# Patient Record
Sex: FEMALE | Race: WHITE
[De-identification: names, ages, dates, MRNs, and addresses within clinical notes are randomized per-mention and may not be internally consistent; named-entity substitution may affect disease eponyms.]

---

## 2020-01-09 NOTE — MMO
Bilateral MAMMO Bilat Screen [facility].

 

CLINICAL HISTORY:

Patient is 69 years old and is seen for screening. The patient has no family

history of breast cancer.  The patient has no personal history of cancer.

 

VIEWS:

The views performed were:  bilateral craniocaudal with tomosynthesis and

bilateral mediolateral oblique with tomosynthesis.

 

FILMS COMPARED:

The present examination has been compared to a prior imaging study performed at

[facility] on 01/10/2019.

 

This study has been interpreted with the assistance of computer-aided detection.

 

MAMMOGRAM FINDINGS:

There are scattered fibroglandular densities.

 

There are stable benign appearing calcifications seen in both breasts.

 

There are no suspicious masses, suspicious calcifications, or new areas of

architectural distortion.

 

IMPRESSION:

THERE IS NO MAMMOGRAPHIC EVIDENCE OF MALIGNANCY.

 

A ROUTINE FOLLOW-UP MAMMOGRAM IN 1 YEAR IS RECOMMENDED.

 

THE RESULTS OF THIS EXAM WERE SENT TO THE PATIENT.

 

ACR BI-RADS Category 2 - Benign finding

 

MAMMOGRAPHY NOTE:

 1. A negative mammogram report should not delay a biopsy if a dominant of

 clinically suspicious mass is present.

 2. Approximately 10% to 15% of breast cancers are not detected by

 mammography.

 3. Adenosis and dense breasts may obscure an underlying neoplasm.

 

 

Reported by: KARUNA GREEN MD

Electonically Signed: [id number]

## 2020-01-09 NOTE — ULT
BILATERAL RENAL ULTRASOUND:



HISTORY:

Chronic kidney disease.



COMPARISON:

None.



TECHNIQUE:

Gray-scale imaging of the kidneys was performed. Renal vascular imaging was performed with gray-scale
, color-flow, Doppler imaging and spectral wave-form analysis.



FINDINGS:

Right kidney: Normal cortical echotexture. No hydronephrosis.

Right kidney measurements: 4.6 x 8.6 x 7.2 cm. 

Left kidney: Normal cortical echotexture. No hydronephrosis.

Left kidney measurements 4.7 x 9.9 x 5.3 cm.

Urinary bladder: Unremarkable. Bladder volume is 56 mL.



RENAL DOPPLER:

Right renal artery: 129.0 cm/s

Left renal artery: 1.0 cm/s

Aorta: 97.2 cm/s

Right renal artery to aorta ratio: 1.33

Left renal artery to aorta ratio: 1.04

Right acuate artery resistive index: 0.6

Left arcuate artery resistive index: 0.61



IMPRESSION: 

1. No hydronephrosis.



2. Unremarkable renal Doppler.



Transcribed Date/Time: 1/9/2020 11:52 AM



Reported By: Nicole Leyva 

Electronically Signed:  1/9/2020 4:11 PM

## 2021-05-06 ENCOUNTER — HOSPITAL ENCOUNTER (OUTPATIENT)
Dept: HOSPITAL 92 - BICMAMMO | Age: 71
Discharge: HOME | End: 2021-05-06
Payer: MEDICARE

## 2021-05-06 DIAGNOSIS — Z12.31: Primary | ICD-10-CM

## 2021-05-06 PROCEDURE — 77063 BREAST TOMOSYNTHESIS BI: CPT

## 2021-05-06 PROCEDURE — 77067 SCR MAMMO BI INCL CAD: CPT

## 2022-06-29 ENCOUNTER — HOSPITAL ENCOUNTER (OUTPATIENT)
Dept: HOSPITAL 9 - MADLAB | Age: 72
Discharge: HOME | End: 2022-06-29
Attending: FAMILY MEDICINE
Payer: MEDICARE

## 2022-06-29 DIAGNOSIS — E11.29: ICD-10-CM

## 2022-06-29 DIAGNOSIS — E78.2: ICD-10-CM

## 2022-06-29 DIAGNOSIS — E03.9: ICD-10-CM

## 2022-06-29 DIAGNOSIS — I11.9: Primary | ICD-10-CM

## 2022-06-29 LAB
ALBUMIN SERPL BCG-MCNC: 3.7 G/DL (ref 3.4–4.8)
ALP SERPL-CCNC: 77 U/L (ref 40–110)
ALT SERPL W P-5'-P-CCNC: 29 U/L (ref 8–55)
ANION GAP SERPL CALC-SCNC: 17 MMOL/L (ref 10–20)
AST SERPL-CCNC: 37 U/L (ref 5–34)
BILIRUB SERPL-MCNC: 0.7 MG/DL (ref 0.2–1.2)
BUN SERPL-MCNC: 17 MG/DL (ref 9.8–20.1)
CALCIUM SERPL-MCNC: 10.2 MG/DL (ref 7.8–10.44)
CHD RISK SERPL-RTO: 4.8 (ref ?–4.5)
CHLORIDE SERPL-SCNC: 105 MMOL/L (ref 98–107)
CHOLEST SERPL-MCNC: 208 MG/DL
CO2 SERPL-SCNC: 23 MMOL/L (ref 23–31)
CREAT CL PREDICTED SERPL C-G-VRATE: 0 ML/MIN (ref 70–130)
GLOBULIN SER CALC-MCNC: 3.6 G/DL (ref 2.4–3.5)
GLUCOSE SERPL-MCNC: 231 MG/DL (ref 83–110)
HDLC SERPL-MCNC: 43 MG/DL
LDLC SERPL CALC-MCNC: 137 MG/DL
POTASSIUM SERPL-SCNC: 4.3 MMOL/L (ref 3.5–5.1)
SODIUM SERPL-SCNC: 141 MMOL/L (ref 136–145)
TRIGL SERPL-MCNC: 141 MG/DL (ref ?–150)

## 2022-06-29 PROCEDURE — 83036 HEMOGLOBIN GLYCOSYLATED A1C: CPT

## 2022-06-29 PROCEDURE — 80061 LIPID PANEL: CPT

## 2022-06-29 PROCEDURE — 84443 ASSAY THYROID STIM HORMONE: CPT

## 2022-06-29 PROCEDURE — 36415 COLL VENOUS BLD VENIPUNCTURE: CPT

## 2022-06-29 PROCEDURE — 80053 COMPREHEN METABOLIC PANEL: CPT

## 2022-08-26 ENCOUNTER — HOSPITAL ENCOUNTER (EMERGENCY)
Dept: HOSPITAL 9 - MADERS | Age: 72
Discharge: HOME | End: 2022-08-26
Payer: MEDICARE
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
## 2022-08-26 DIAGNOSIS — I10: ICD-10-CM

## 2022-08-26 DIAGNOSIS — Z79.899: ICD-10-CM

## 2022-08-26 DIAGNOSIS — S80.01XA: Primary | ICD-10-CM

## 2022-08-26 DIAGNOSIS — Z79.4: ICD-10-CM

## 2022-08-26 DIAGNOSIS — Z79.82: ICD-10-CM

## 2022-08-26 PROCEDURE — 99283 EMERGENCY DEPT VISIT LOW MDM: CPT

## 2022-08-28 ENCOUNTER — HOSPITAL ENCOUNTER (INPATIENT)
Dept: HOSPITAL 92 - ERS | Age: 72
LOS: 9 days | Discharge: NURSING, REHAB OR LONG-TERM CARE | DRG: 682 | End: 2022-09-06
Attending: INTERNAL MEDICINE | Admitting: INTERNAL MEDICINE
Payer: MEDICARE

## 2022-08-28 VITALS — BODY MASS INDEX: 53.4 KG/M2

## 2022-08-28 DIAGNOSIS — Z88.0: ICD-10-CM

## 2022-08-28 DIAGNOSIS — N17.9: Primary | ICD-10-CM

## 2022-08-28 DIAGNOSIS — Z20.822: ICD-10-CM

## 2022-08-28 DIAGNOSIS — M62.82: ICD-10-CM

## 2022-08-28 DIAGNOSIS — B96.4: ICD-10-CM

## 2022-08-28 DIAGNOSIS — I10: ICD-10-CM

## 2022-08-28 DIAGNOSIS — Z88.1: ICD-10-CM

## 2022-08-28 DIAGNOSIS — F32.A: ICD-10-CM

## 2022-08-28 DIAGNOSIS — N39.0: ICD-10-CM

## 2022-08-28 DIAGNOSIS — Z79.4: ICD-10-CM

## 2022-08-28 DIAGNOSIS — G93.41: ICD-10-CM

## 2022-08-28 DIAGNOSIS — Z90.710: ICD-10-CM

## 2022-08-28 DIAGNOSIS — E87.6: ICD-10-CM

## 2022-08-28 DIAGNOSIS — Z88.5: ICD-10-CM

## 2022-08-28 DIAGNOSIS — Z90.49: ICD-10-CM

## 2022-08-28 DIAGNOSIS — R29.6: ICD-10-CM

## 2022-08-28 DIAGNOSIS — E11.65: ICD-10-CM

## 2022-08-28 DIAGNOSIS — E66.01: ICD-10-CM

## 2022-08-28 DIAGNOSIS — E87.2: ICD-10-CM

## 2022-08-28 DIAGNOSIS — E86.0: ICD-10-CM

## 2022-08-28 LAB
ALBUMIN SERPL BCG-MCNC: 3.7 G/DL (ref 3.4–4.8)
ALP SERPL-CCNC: 78 U/L (ref 40–110)
ALT SERPL W P-5'-P-CCNC: 34 U/L (ref 8–55)
ANION GAP SERPL CALC-SCNC: 17 MMOL/L (ref 10–20)
AST SERPL-CCNC: 76 U/L (ref 5–34)
BACTERIA UR QL AUTO: (no result) HPF
BASOPHILS # BLD AUTO: 0.1 THOU/UL (ref 0–0.2)
BASOPHILS NFR BLD AUTO: 0.7 % (ref 0–1)
BILIRUB SERPL-MCNC: 1.3 MG/DL (ref 0.2–1.2)
BUN SERPL-MCNC: 30 MG/DL (ref 9.8–20.1)
CALCIUM SERPL-MCNC: 9.9 MG/DL (ref 7.8–10.44)
CHLORIDE SERPL-SCNC: 104 MMOL/L (ref 98–107)
CK SERPL-CCNC: 2451 U/L (ref 29–168)
CO2 SERPL-SCNC: 22 MMOL/L (ref 23–31)
CREAT CL PREDICTED SERPL C-G-VRATE: 0 ML/MIN (ref 70–130)
EOSINOPHIL # BLD AUTO: 0.3 THOU/UL (ref 0–0.7)
EOSINOPHIL NFR BLD AUTO: 3 % (ref 0–10)
GLOBULIN SER CALC-MCNC: 3.4 G/DL (ref 2.4–3.5)
GLUCOSE SERPL-MCNC: 340 MG/DL (ref 83–110)
HGB BLD-MCNC: 13.1 G/DL (ref 12–16)
LEUKOCYTE ESTERASE UR QL STRIP.AUTO: 500 LEU/UL
LIPASE SERPL-CCNC: 6 U/L (ref 8–78)
LYMPHOCYTES # BLD: 1.4 THOU/UL (ref 1.2–3.4)
LYMPHOCYTES NFR BLD AUTO: 13.6 % (ref 21–51)
MAGNESIUM SERPL-MCNC: 1.8 MG/DL (ref 1.6–2.6)
MCH RBC QN AUTO: 34.9 PG (ref 27–31)
MCV RBC AUTO: 102 FL (ref 78–98)
MONOCYTES # BLD AUTO: 0.8 THOU/UL (ref 0.11–0.59)
MONOCYTES NFR BLD AUTO: 8.1 % (ref 0–10)
NEUTROPHILS # BLD AUTO: 7.4 THOU/UL (ref 1.4–6.5)
NEUTROPHILS NFR BLD AUTO: 74.5 % (ref 42–75)
PLATELET # BLD AUTO: 168 THOU/UL (ref 130–400)
POTASSIUM SERPL-SCNC: 3.1 MMOL/L (ref 3.5–5.1)
PROT UR STRIP.AUTO-MCNC: 50 MG/DL
RBC # BLD AUTO: 3.74 MILL/UL (ref 4.2–5.4)
RBC UR QL AUTO: (no result) HPF (ref 0–3)
SODIUM SERPL-SCNC: 140 MMOL/L (ref 136–145)
SP GR UR STRIP: 1.02 (ref 1–1.04)
WBC # BLD AUTO: 9.9 THOU/UL (ref 4.8–10.8)

## 2022-08-28 PROCEDURE — 97139 UNLISTED THERAPEUTIC PX: CPT

## 2022-08-28 PROCEDURE — 94640 AIRWAY INHALATION TREATMENT: CPT

## 2022-08-28 PROCEDURE — 71045 X-RAY EXAM CHEST 1 VIEW: CPT

## 2022-08-28 PROCEDURE — 87186 SC STD MICRODIL/AGAR DIL: CPT

## 2022-08-28 PROCEDURE — 36600 WITHDRAWAL OF ARTERIAL BLOOD: CPT

## 2022-08-28 PROCEDURE — 36415 COLL VENOUS BLD VENIPUNCTURE: CPT

## 2022-08-28 PROCEDURE — 83880 ASSAY OF NATRIURETIC PEPTIDE: CPT

## 2022-08-28 PROCEDURE — 96361 HYDRATE IV INFUSION ADD-ON: CPT

## 2022-08-28 PROCEDURE — 87077 CULTURE AEROBIC IDENTIFY: CPT

## 2022-08-28 PROCEDURE — 81015 MICROSCOPIC EXAM OF URINE: CPT

## 2022-08-28 PROCEDURE — 80048 BASIC METABOLIC PNL TOTAL CA: CPT

## 2022-08-28 PROCEDURE — 84443 ASSAY THYROID STIM HORMONE: CPT

## 2022-08-28 PROCEDURE — 72170 X-RAY EXAM OF PELVIS: CPT

## 2022-08-28 PROCEDURE — 82140 ASSAY OF AMMONIA: CPT

## 2022-08-28 PROCEDURE — 70450 CT HEAD/BRAIN W/O DYE: CPT

## 2022-08-28 PROCEDURE — 36416 COLLJ CAPILLARY BLOOD SPEC: CPT

## 2022-08-28 PROCEDURE — 96365 THER/PROPH/DIAG IV INF INIT: CPT

## 2022-08-28 PROCEDURE — 51701 INSERT BLADDER CATHETER: CPT

## 2022-08-28 PROCEDURE — 93005 ELECTROCARDIOGRAM TRACING: CPT

## 2022-08-28 PROCEDURE — 96367 TX/PROPH/DG ADDL SEQ IV INF: CPT

## 2022-08-28 PROCEDURE — 85025 COMPLETE CBC W/AUTO DIFF WBC: CPT

## 2022-08-28 PROCEDURE — 84300 ASSAY OF URINE SODIUM: CPT

## 2022-08-28 PROCEDURE — U0003 INFECTIOUS AGENT DETECTION BY NUCLEIC ACID (DNA OR RNA); SEVERE ACUTE RESPIRATORY SYNDROME CORONAVIRUS 2 (SARS-COV-2) (CORONAVIRUS DISEASE [COVID-19]), AMPLIFIED PROBE TECHNIQUE, MAKING USE OF HIGH THROUGHPUT TECHNOLOGIES AS DESCRIBED BY CMS-2020-01-R: HCPCS

## 2022-08-28 PROCEDURE — 81003 URINALYSIS AUTO W/O SCOPE: CPT

## 2022-08-28 PROCEDURE — 87086 URINE CULTURE/COLONY COUNT: CPT

## 2022-08-28 PROCEDURE — U0005 INFEC AGEN DETEC AMPLI PROBE: HCPCS

## 2022-08-28 PROCEDURE — 82805 BLOOD GASES W/O2 SATURATION: CPT

## 2022-08-28 PROCEDURE — 87040 BLOOD CULTURE FOR BACTERIA: CPT

## 2022-08-28 PROCEDURE — 80053 COMPREHEN METABOLIC PANEL: CPT

## 2022-08-28 PROCEDURE — 83690 ASSAY OF LIPASE: CPT

## 2022-08-28 PROCEDURE — 82570 ASSAY OF URINE CREATININE: CPT

## 2022-08-28 PROCEDURE — 82550 ASSAY OF CK (CPK): CPT

## 2022-08-28 PROCEDURE — 83735 ASSAY OF MAGNESIUM: CPT

## 2022-08-28 PROCEDURE — 80069 RENAL FUNCTION PANEL: CPT

## 2022-08-29 LAB
ALBUMIN SERPL BCG-MCNC: 3.2 G/DL (ref 3.4–4.8)
ANION GAP SERPL CALC-SCNC: 18 MMOL/L (ref 10–20)
BASOPHILS # BLD AUTO: 0.1 THOU/UL (ref 0–0.2)
BASOPHILS NFR BLD AUTO: 0.9 % (ref 0–1)
BUN SERPL-MCNC: 27 MG/DL (ref 9.8–20.1)
BUN/CREAT SERPL: 15.88
CALCIUM SERPL-MCNC: 8.8 MG/DL (ref 7.8–10.44)
CHLORIDE SERPL-SCNC: 109 MMOL/L (ref 98–107)
CK SERPL-CCNC: 3359 U/L (ref 29–168)
CO2 SERPL-SCNC: 17 MMOL/L (ref 23–31)
CREAT CL PREDICTED SERPL C-G-VRATE: 0 ML/MIN (ref 70–130)
EOSINOPHIL # BLD AUTO: 0.5 THOU/UL (ref 0–0.7)
EOSINOPHIL NFR BLD AUTO: 5.5 % (ref 0–10)
GLUCOSE SERPL-MCNC: 329 MG/DL (ref 83–110)
HGB BLD-MCNC: 12.3 G/DL (ref 12–16)
LYMPHOCYTES # BLD: 1.8 THOU/UL (ref 1.2–3.4)
LYMPHOCYTES NFR BLD AUTO: 20.1 % (ref 21–51)
MCH RBC QN AUTO: 34.1 PG (ref 27–31)
MCV RBC AUTO: 103 FL (ref 78–98)
MONOCYTES # BLD AUTO: 0.8 THOU/UL (ref 0.11–0.59)
MONOCYTES NFR BLD AUTO: 8.7 % (ref 0–10)
NEUTROPHILS # BLD AUTO: 5.9 THOU/UL (ref 1.4–6.5)
NEUTROPHILS NFR BLD AUTO: 64.8 % (ref 42–75)
PLATELET # BLD AUTO: 138 THOU/UL (ref 130–400)
POTASSIUM SERPL-SCNC: 3.5 MMOL/L (ref 3.5–5.1)
RBC # BLD AUTO: 3.6 MILL/UL (ref 4.2–5.4)
SODIUM SERPL-SCNC: 140 MMOL/L (ref 136–145)
SODIUM UR-SCNC: 40 MMOL/L
WBC # BLD AUTO: 9.1 THOU/UL (ref 4.8–10.8)

## 2022-08-29 RX ADMIN — INSULIN LISPRO PRN UNIT: 100 INJECTION, SOLUTION INTRAVENOUS; SUBCUTANEOUS at 22:29

## 2022-08-29 RX ADMIN — INSULIN GLARGINE SCH UNITS: 100 INJECTION, SOLUTION SUBCUTANEOUS at 22:29

## 2022-08-29 RX ADMIN — INSULIN GLARGINE SCH UNITS: 100 INJECTION, SOLUTION SUBCUTANEOUS at 04:10

## 2022-08-29 RX ADMIN — INSULIN LISPRO PRN UNIT: 100 INJECTION, SOLUTION INTRAVENOUS; SUBCUTANEOUS at 12:43

## 2022-08-29 RX ADMIN — INSULIN LISPRO PRN UNIT: 100 INJECTION, SOLUTION INTRAVENOUS; SUBCUTANEOUS at 16:35

## 2022-08-30 LAB
ALBUMIN SERPL BCG-MCNC: 3.1 G/DL (ref 3.4–4.8)
ANION GAP SERPL CALC-SCNC: 13 MMOL/L (ref 10–20)
BUN SERPL-MCNC: 23 MG/DL (ref 9.8–20.1)
BUN/CREAT SERPL: 15.13
CALCIUM SERPL-MCNC: 8.7 MG/DL (ref 7.8–10.44)
CHLORIDE SERPL-SCNC: 110 MMOL/L (ref 98–107)
CK SERPL-CCNC: 2270 U/L (ref 29–168)
CO2 SERPL-SCNC: 22 MMOL/L (ref 23–31)
CREAT CL PREDICTED SERPL C-G-VRATE: 71 ML/MIN (ref 70–130)
GLUCOSE SERPL-MCNC: 251 MG/DL (ref 83–110)
POTASSIUM SERPL-SCNC: 3.6 MMOL/L (ref 3.5–5.1)
SODIUM SERPL-SCNC: 141 MMOL/L (ref 136–145)

## 2022-08-30 RX ADMIN — CEFTRIAXONE SCH MLS: 1 INJECTION, POWDER, FOR SOLUTION INTRAMUSCULAR; INTRAVENOUS at 05:33

## 2022-08-30 RX ADMIN — INSULIN LISPRO PRN UNIT: 100 INJECTION, SOLUTION INTRAVENOUS; SUBCUTANEOUS at 17:25

## 2022-08-30 RX ADMIN — CEFTRIAXONE SCH MLS: 1 INJECTION, POWDER, FOR SOLUTION INTRAMUSCULAR; INTRAVENOUS at 23:30

## 2022-08-30 RX ADMIN — INSULIN LISPRO PRN UNIT: 100 INJECTION, SOLUTION INTRAVENOUS; SUBCUTANEOUS at 11:51

## 2022-08-30 RX ADMIN — INSULIN LISPRO PRN UNIT: 100 INJECTION, SOLUTION INTRAVENOUS; SUBCUTANEOUS at 22:02

## 2022-08-31 LAB
ALBUMIN SERPL BCG-MCNC: 3.2 G/DL (ref 3.4–4.8)
ANION GAP SERPL CALC-SCNC: 17 MMOL/L (ref 10–20)
BUN SERPL-MCNC: 19 MG/DL (ref 9.8–20.1)
BUN/CREAT SERPL: 14.29
CALCIUM SERPL-MCNC: 9.1 MG/DL (ref 7.8–10.44)
CHLORIDE SERPL-SCNC: 108 MMOL/L (ref 98–107)
CK SERPL-CCNC: 1612 U/L (ref 29–168)
CO2 SERPL-SCNC: 19 MMOL/L (ref 23–31)
CREAT CL PREDICTED SERPL C-G-VRATE: 81 ML/MIN (ref 70–130)
GLUCOSE SERPL-MCNC: 212 MG/DL (ref 83–110)
POTASSIUM SERPL-SCNC: 3.8 MMOL/L (ref 3.5–5.1)
SODIUM SERPL-SCNC: 140 MMOL/L (ref 136–145)

## 2022-08-31 RX ADMIN — INSULIN LISPRO PRN UNIT: 100 INJECTION, SOLUTION INTRAVENOUS; SUBCUTANEOUS at 17:26

## 2022-08-31 RX ADMIN — INSULIN GLARGINE SCH UNITS: 100 INJECTION, SOLUTION SUBCUTANEOUS at 21:01

## 2022-08-31 RX ADMIN — INSULIN LISPRO PRN UNIT: 100 INJECTION, SOLUTION INTRAVENOUS; SUBCUTANEOUS at 21:01

## 2022-08-31 RX ADMIN — INSULIN LISPRO PRN UNIT: 100 INJECTION, SOLUTION INTRAVENOUS; SUBCUTANEOUS at 06:35

## 2022-08-31 RX ADMIN — INSULIN LISPRO PRN UNIT: 100 INJECTION, SOLUTION INTRAVENOUS; SUBCUTANEOUS at 12:48

## 2022-09-01 LAB
ALBUMIN SERPL BCG-MCNC: 3.3 G/DL (ref 3.4–4.8)
ALP SERPL-CCNC: 64 U/L (ref 40–110)
ALT SERPL W P-5'-P-CCNC: 48 U/L (ref 8–55)
ANALYZER IN CARDIO: (no result)
ANION GAP SERPL CALC-SCNC: 15 MMOL/L (ref 10–20)
AST SERPL-CCNC: 69 U/L (ref 5–34)
BASE EXCESS STD BLDA CALC-SCNC: 0.4 MEQ/L
BASOPHILS # BLD AUTO: 0.1 THOU/UL (ref 0–0.2)
BASOPHILS NFR BLD AUTO: 1.4 % (ref 0–1)
BILIRUB SERPL-MCNC: 0.7 MG/DL (ref 0.2–1.2)
BUN SERPL-MCNC: 15 MG/DL (ref 9.8–20.1)
CA-I BLDA-SCNC: 1.21 MMOL/L (ref 1.12–1.3)
CALCIUM SERPL-MCNC: 9.1 MG/DL (ref 7.8–10.44)
CHLORIDE SERPL-SCNC: 108 MMOL/L (ref 98–107)
CO2 SERPL-SCNC: 20 MMOL/L (ref 23–31)
CREAT CL PREDICTED SERPL C-G-VRATE: 90 ML/MIN (ref 70–130)
EOSINOPHIL # BLD AUTO: 0.4 THOU/UL (ref 0–0.7)
EOSINOPHIL NFR BLD AUTO: 6.3 % (ref 0–10)
GLOBULIN SER CALC-MCNC: 3 G/DL (ref 2.4–3.5)
GLUCOSE SERPL-MCNC: 269 MG/DL (ref 83–110)
HCO3 BLDA-SCNC: 24.1 MEQ/L (ref 22–28)
HCT VFR BLDA CALC: 37 % (ref 36–47)
HGB BLD-MCNC: 12.1 G/DL (ref 12–16)
HGB BLDA-MCNC: 12.5 G/DL (ref 12–16)
LYMPHOCYTES # BLD: 1.6 THOU/UL (ref 1.2–3.4)
LYMPHOCYTES NFR BLD AUTO: 25.1 % (ref 21–51)
MCH RBC QN AUTO: 35.1 PG (ref 27–31)
MCV RBC AUTO: 103 FL (ref 78–98)
MONOCYTES # BLD AUTO: 0.6 THOU/UL (ref 0.11–0.59)
MONOCYTES NFR BLD AUTO: 8.8 % (ref 0–10)
NEUTROPHILS # BLD AUTO: 3.7 THOU/UL (ref 1.4–6.5)
NEUTROPHILS NFR BLD AUTO: 58.4 % (ref 42–75)
O2 A-A PPRESDIFF RESPIRATORY: 32.66 MMHG (ref 0–20)
PCO2 BLDA: 35.9 MMHG (ref 35–45)
PH BLDA: 7.45 [PH] (ref 7.35–7.45)
PLATELET # BLD AUTO: 160 THOU/UL (ref 130–400)
PO2 BLDA: 72.2 MMHG (ref 70–?)
POTASSIUM BLD-SCNC: 4.35 MMOL/L (ref 3.7–5.3)
POTASSIUM SERPL-SCNC: 4.2 MMOL/L (ref 3.5–5.1)
RBC # BLD AUTO: 3.46 MILL/UL (ref 4.2–5.4)
SODIUM SERPL-SCNC: 139 MMOL/L (ref 136–145)
SPECIMEN DRAWN FROM PATIENT: (no result)
WBC # BLD AUTO: 6.3 THOU/UL (ref 4.8–10.8)

## 2022-09-01 RX ADMIN — INSULIN GLARGINE SCH UNITS: 100 INJECTION, SOLUTION SUBCUTANEOUS at 08:45

## 2022-09-01 RX ADMIN — Medication SCH: at 08:52

## 2022-09-01 RX ADMIN — Medication SCH ML: at 21:17

## 2022-09-01 RX ADMIN — INSULIN LISPRO PRN UNIT: 100 INJECTION, SOLUTION INTRAVENOUS; SUBCUTANEOUS at 06:38

## 2022-09-01 RX ADMIN — INSULIN LISPRO PRN UNIT: 100 INJECTION, SOLUTION INTRAVENOUS; SUBCUTANEOUS at 21:28

## 2022-09-01 RX ADMIN — INSULIN GLARGINE SCH UNITS: 100 INJECTION, SOLUTION SUBCUTANEOUS at 21:16

## 2022-09-02 LAB
ANION GAP SERPL CALC-SCNC: 13 MMOL/L (ref 10–20)
BUN SERPL-MCNC: 16 MG/DL (ref 9.8–20.1)
CALCIUM SERPL-MCNC: 9.3 MG/DL (ref 7.8–10.44)
CHLORIDE SERPL-SCNC: 105 MMOL/L (ref 98–107)
CO2 SERPL-SCNC: 21 MMOL/L (ref 23–31)
CREAT CL PREDICTED SERPL C-G-VRATE: 87 ML/MIN (ref 70–130)
GLUCOSE SERPL-MCNC: 288 MG/DL (ref 83–110)
POTASSIUM SERPL-SCNC: 4.3 MMOL/L (ref 3.5–5.1)
SODIUM SERPL-SCNC: 135 MMOL/L (ref 136–145)

## 2022-09-02 RX ADMIN — Medication SCH ML: at 20:32

## 2022-09-02 RX ADMIN — INSULIN GLARGINE SCH UNITS: 100 INJECTION, SOLUTION SUBCUTANEOUS at 20:31

## 2022-09-02 RX ADMIN — Medication SCH ML: at 09:34

## 2022-09-02 RX ADMIN — INSULIN LISPRO PRN UNIT: 100 INJECTION, SOLUTION INTRAVENOUS; SUBCUTANEOUS at 12:11

## 2022-09-02 RX ADMIN — INSULIN GLARGINE SCH UNITS: 100 INJECTION, SOLUTION SUBCUTANEOUS at 08:53

## 2022-09-02 RX ADMIN — INSULIN LISPRO PRN UNIT: 100 INJECTION, SOLUTION INTRAVENOUS; SUBCUTANEOUS at 05:23

## 2022-09-03 LAB
ANION GAP SERPL CALC-SCNC: 15 MMOL/L (ref 10–20)
BASOPHILS # BLD AUTO: 0.1 THOU/UL (ref 0–0.2)
BASOPHILS NFR BLD AUTO: 1.5 % (ref 0–1)
BUN SERPL-MCNC: 19 MG/DL (ref 9.8–20.1)
CALCIUM SERPL-MCNC: 9.3 MG/DL (ref 7.8–10.44)
CHLORIDE SERPL-SCNC: 104 MMOL/L (ref 98–107)
CK SERPL-CCNC: 264 U/L (ref 29–168)
CO2 SERPL-SCNC: 26 MMOL/L (ref 23–31)
CREAT CL PREDICTED SERPL C-G-VRATE: 78 ML/MIN (ref 70–130)
EOSINOPHIL # BLD AUTO: 0.6 THOU/UL (ref 0–0.7)
EOSINOPHIL NFR BLD AUTO: 10.6 % (ref 0–10)
GLUCOSE SERPL-MCNC: 210 MG/DL (ref 83–110)
HGB BLD-MCNC: 12.4 G/DL (ref 12–16)
LYMPHOCYTES # BLD: 1.2 THOU/UL (ref 1.2–3.4)
LYMPHOCYTES NFR BLD AUTO: 20.2 % (ref 21–51)
MCH RBC QN AUTO: 35.7 PG (ref 27–31)
MCV RBC AUTO: 104 FL (ref 78–98)
MONOCYTES # BLD AUTO: 0.5 THOU/UL (ref 0.11–0.59)
MONOCYTES NFR BLD AUTO: 8.3 % (ref 0–10)
NEUTROPHILS # BLD AUTO: 3.6 THOU/UL (ref 1.4–6.5)
NEUTROPHILS NFR BLD AUTO: 59.5 % (ref 42–75)
PLATELET # BLD AUTO: 170 THOU/UL (ref 130–400)
POTASSIUM SERPL-SCNC: 3.7 MMOL/L (ref 3.5–5.1)
RBC # BLD AUTO: 3.49 MILL/UL (ref 4.2–5.4)
SODIUM SERPL-SCNC: 141 MMOL/L (ref 136–145)
WBC # BLD AUTO: 6.1 THOU/UL (ref 4.8–10.8)

## 2022-09-03 RX ADMIN — INSULIN GLARGINE SCH UNITS: 100 INJECTION, SOLUTION SUBCUTANEOUS at 09:04

## 2022-09-03 RX ADMIN — INSULIN LISPRO PRN UNIT: 100 INJECTION, SOLUTION INTRAVENOUS; SUBCUTANEOUS at 05:55

## 2022-09-03 RX ADMIN — Medication SCH: at 15:00

## 2022-09-03 RX ADMIN — INSULIN LISPRO PRN UNIT: 100 INJECTION, SOLUTION INTRAVENOUS; SUBCUTANEOUS at 18:15

## 2022-09-03 RX ADMIN — INSULIN LISPRO PRN UNIT: 100 INJECTION, SOLUTION INTRAVENOUS; SUBCUTANEOUS at 12:02

## 2022-09-03 RX ADMIN — INSULIN GLARGINE SCH UNITS: 100 INJECTION, SOLUTION SUBCUTANEOUS at 20:01

## 2022-09-03 RX ADMIN — INSULIN LISPRO PRN UNIT: 100 INJECTION, SOLUTION INTRAVENOUS; SUBCUTANEOUS at 22:21

## 2022-09-03 RX ADMIN — Medication SCH: at 20:03

## 2022-09-04 RX ADMIN — Medication SCH: at 20:00

## 2022-09-04 RX ADMIN — Medication SCH: at 08:46

## 2022-09-04 RX ADMIN — INSULIN GLARGINE SCH UNITS: 100 INJECTION, SOLUTION SUBCUTANEOUS at 08:44

## 2022-09-04 RX ADMIN — INSULIN LISPRO PRN UNIT: 100 INJECTION, SOLUTION INTRAVENOUS; SUBCUTANEOUS at 20:14

## 2022-09-04 RX ADMIN — INSULIN LISPRO PRN UNIT: 100 INJECTION, SOLUTION INTRAVENOUS; SUBCUTANEOUS at 05:48

## 2022-09-04 RX ADMIN — INSULIN GLARGINE SCH UNITS: 100 INJECTION, SOLUTION SUBCUTANEOUS at 19:59

## 2022-09-05 RX ADMIN — Medication SCH: at 08:32

## 2022-09-05 RX ADMIN — INSULIN LISPRO PRN UNIT: 100 INJECTION, SOLUTION INTRAVENOUS; SUBCUTANEOUS at 05:57

## 2022-09-05 RX ADMIN — INSULIN GLARGINE SCH UNITS: 100 INJECTION, SOLUTION SUBCUTANEOUS at 20:14

## 2022-09-05 RX ADMIN — Medication SCH: at 20:15

## 2022-09-05 RX ADMIN — INSULIN LISPRO PRN UNIT: 100 INJECTION, SOLUTION INTRAVENOUS; SUBCUTANEOUS at 21:38

## 2022-09-05 RX ADMIN — INSULIN GLARGINE SCH UNITS: 100 INJECTION, SOLUTION SUBCUTANEOUS at 08:32

## 2022-09-05 RX ADMIN — INSULIN LISPRO PRN UNIT: 100 INJECTION, SOLUTION INTRAVENOUS; SUBCUTANEOUS at 11:46

## 2022-09-05 RX ADMIN — INSULIN LISPRO PRN UNIT: 100 INJECTION, SOLUTION INTRAVENOUS; SUBCUTANEOUS at 16:16

## 2022-09-06 ENCOUNTER — HOSPITAL ENCOUNTER (INPATIENT)
Dept: HOSPITAL 9 - MADMS | Age: 72
LOS: 14 days | Discharge: HOME | DRG: 948 | End: 2022-09-20
Attending: FAMILY MEDICINE | Admitting: FAMILY MEDICINE
Payer: COMMERCIAL

## 2022-09-06 VITALS — SYSTOLIC BLOOD PRESSURE: 121 MMHG | DIASTOLIC BLOOD PRESSURE: 67 MMHG | TEMPERATURE: 98.5 F

## 2022-09-06 VITALS — BODY MASS INDEX: 52.5 KG/M2

## 2022-09-06 DIAGNOSIS — Z20.822: ICD-10-CM

## 2022-09-06 DIAGNOSIS — R53.1: Primary | ICD-10-CM

## 2022-09-06 DIAGNOSIS — E03.9: ICD-10-CM

## 2022-09-06 DIAGNOSIS — R53.81: ICD-10-CM

## 2022-09-06 DIAGNOSIS — N18.30: ICD-10-CM

## 2022-09-06 DIAGNOSIS — E66.01: ICD-10-CM

## 2022-09-06 DIAGNOSIS — Z88.6: ICD-10-CM

## 2022-09-06 DIAGNOSIS — Z79.4: ICD-10-CM

## 2022-09-06 DIAGNOSIS — E78.5: ICD-10-CM

## 2022-09-06 DIAGNOSIS — Z79.899: ICD-10-CM

## 2022-09-06 DIAGNOSIS — F41.9: ICD-10-CM

## 2022-09-06 DIAGNOSIS — F32.A: ICD-10-CM

## 2022-09-06 DIAGNOSIS — I13.10: ICD-10-CM

## 2022-09-06 DIAGNOSIS — E11.65: ICD-10-CM

## 2022-09-06 DIAGNOSIS — Z90.710: ICD-10-CM

## 2022-09-06 DIAGNOSIS — Z60.2: ICD-10-CM

## 2022-09-06 DIAGNOSIS — Z90.49: ICD-10-CM

## 2022-09-06 DIAGNOSIS — Z88.0: ICD-10-CM

## 2022-09-06 DIAGNOSIS — Z88.1: ICD-10-CM

## 2022-09-06 DIAGNOSIS — Z79.890: ICD-10-CM

## 2022-09-06 DIAGNOSIS — E11.22: ICD-10-CM

## 2022-09-06 DIAGNOSIS — N17.9: ICD-10-CM

## 2022-09-06 PROCEDURE — 87811 SARS-COV-2 COVID19 W/OPTIC: CPT

## 2022-09-06 PROCEDURE — 36416 COLLJ CAPILLARY BLOOD SPEC: CPT

## 2022-09-06 PROCEDURE — 85025 COMPLETE CBC W/AUTO DIFF WBC: CPT

## 2022-09-06 PROCEDURE — 36415 COLL VENOUS BLD VENIPUNCTURE: CPT

## 2022-09-06 PROCEDURE — 80053 COMPREHEN METABOLIC PANEL: CPT

## 2022-09-06 RX ADMIN — INSULIN GLARGINE SCH UNIT: 100 INJECTION, SOLUTION SUBCUTANEOUS at 21:36

## 2022-09-06 RX ADMIN — INSULIN LISPRO PRN UNIT: 100 INJECTION, SOLUTION INTRAVENOUS; SUBCUTANEOUS at 06:11

## 2022-09-06 RX ADMIN — Medication SCH: at 08:30

## 2022-09-06 RX ADMIN — INSULIN LISPRO PRN UNIT: 100 INJECTION, SOLUTION INTRAVENOUS; SUBCUTANEOUS at 12:15

## 2022-09-06 RX ADMIN — INSULIN GLARGINE SCH UNITS: 100 INJECTION, SOLUTION SUBCUTANEOUS at 08:29

## 2022-09-06 RX ADMIN — INSULIN LISPRO PRN UNITS: 100 INJECTION, SOLUTION INTRAVENOUS; SUBCUTANEOUS at 17:27

## 2022-09-06 SDOH — SOCIAL STABILITY - SOCIAL INSECURITY: PROBLEMS RELATED TO LIVING ALONE: Z60.2

## 2022-09-07 LAB
ALBUMIN SERPL BCG-MCNC: 3.1 G/DL (ref 3.4–4.8)
ALP SERPL-CCNC: 59 U/L (ref 40–110)
ALT SERPL W P-5'-P-CCNC: 33 U/L (ref 8–55)
ANION GAP SERPL CALC-SCNC: 12 MMOL/L (ref 10–20)
AST SERPL-CCNC: 32 U/L (ref 5–34)
BASOPHILS # BLD AUTO: 0.1 THOU/UL (ref 0–0.2)
BASOPHILS NFR BLD AUTO: 2.4 % (ref 0–1)
BILIRUB SERPL-MCNC: 0.6 MG/DL (ref 0.2–1.2)
BUN SERPL-MCNC: 23 MG/DL (ref 9.8–20.1)
CALCIUM SERPL-MCNC: 9.5 MG/DL (ref 7.8–10.44)
CHLORIDE SERPL-SCNC: 108 MMOL/L (ref 98–107)
CO2 SERPL-SCNC: 25 MMOL/L (ref 23–31)
CREAT CL PREDICTED SERPL C-G-VRATE: 75 ML/MIN (ref 70–130)
EOSINOPHIL # BLD AUTO: 0.3 THOU/UL (ref 0–0.7)
EOSINOPHIL NFR BLD AUTO: 7.4 % (ref 0–10)
GLOBULIN SER CALC-MCNC: 3 G/DL (ref 2.4–3.5)
GLUCOSE SERPL-MCNC: 186 MG/DL (ref 83–110)
HGB BLD-MCNC: 11.5 G/DL (ref 12–16)
LYMPHOCYTES # BLD AUTO: 1.4 THOU/UL (ref 1.2–3.4)
LYMPHOCYTES NFR BLD AUTO: 29.1 % (ref 21–51)
MCH RBC QN AUTO: 32 PG (ref 27–31)
MCV RBC AUTO: 100.7 FL (ref 78–98)
MONOCYTES # BLD AUTO: 0.4 THOU/UL (ref 0.11–0.59)
MONOCYTES NFR BLD AUTO: 9.2 % (ref 0–10)
NEUTROPHILS # BLD AUTO: 2.4 THOU/UL (ref 1.4–6.5)
NEUTROPHILS NFR BLD AUTO: 51.8 % (ref 42–75)
PLATELET # BLD AUTO: 161 THOU/UL (ref 130–400)
POTASSIUM SERPL-SCNC: 4.1 MMOL/L (ref 3.5–5.1)
RBC # BLD AUTO: 3.58 MILL/UL (ref 4.2–5.4)
SODIUM SERPL-SCNC: 141 MMOL/L (ref 136–145)
WBC # BLD AUTO: 4.7 THOU/UL (ref 4.8–10.8)

## 2022-09-07 RX ADMIN — INSULIN LISPRO PRN UNITS: 100 INJECTION, SOLUTION INTRAVENOUS; SUBCUTANEOUS at 17:35

## 2022-09-07 RX ADMIN — INSULIN LISPRO PRN UNITS: 100 INJECTION, SOLUTION INTRAVENOUS; SUBCUTANEOUS at 08:42

## 2022-09-07 RX ADMIN — INSULIN GLARGINE SCH UNIT: 100 INJECTION, SOLUTION SUBCUTANEOUS at 20:21

## 2022-09-07 RX ADMIN — INSULIN LISPRO PRN UNITS: 100 INJECTION, SOLUTION INTRAVENOUS; SUBCUTANEOUS at 13:23

## 2022-09-07 RX ADMIN — INSULIN GLARGINE SCH UNIT: 100 INJECTION, SOLUTION SUBCUTANEOUS at 08:40

## 2022-09-08 RX ADMIN — INSULIN LISPRO PRN UNITS: 100 INJECTION, SOLUTION INTRAVENOUS; SUBCUTANEOUS at 12:12

## 2022-09-08 RX ADMIN — INSULIN LISPRO PRN UNITS: 100 INJECTION, SOLUTION INTRAVENOUS; SUBCUTANEOUS at 17:23

## 2022-09-08 RX ADMIN — INSULIN GLARGINE SCH UNIT: 100 INJECTION, SOLUTION SUBCUTANEOUS at 21:13

## 2022-09-08 RX ADMIN — INSULIN LISPRO PRN UNITS: 100 INJECTION, SOLUTION INTRAVENOUS; SUBCUTANEOUS at 08:28

## 2022-09-08 RX ADMIN — INSULIN GLARGINE SCH UNIT: 100 INJECTION, SOLUTION SUBCUTANEOUS at 08:16

## 2022-09-09 RX ADMIN — INSULIN LISPRO PRN UNIT: 100 INJECTION, SOLUTION INTRAVENOUS; SUBCUTANEOUS at 21:08

## 2022-09-09 RX ADMIN — INSULIN LISPRO PRN UNITS: 100 INJECTION, SOLUTION INTRAVENOUS; SUBCUTANEOUS at 08:08

## 2022-09-09 RX ADMIN — INSULIN GLARGINE SCH UNIT: 100 INJECTION, SOLUTION SUBCUTANEOUS at 08:07

## 2022-09-09 RX ADMIN — INSULIN GLARGINE SCH UNIT: 100 INJECTION, SOLUTION SUBCUTANEOUS at 21:07

## 2022-09-09 RX ADMIN — INSULIN LISPRO PRN UNITS: 100 INJECTION, SOLUTION INTRAVENOUS; SUBCUTANEOUS at 17:01

## 2022-09-10 RX ADMIN — INSULIN LISPRO PRN UNITS: 100 INJECTION, SOLUTION INTRAVENOUS; SUBCUTANEOUS at 08:26

## 2022-09-10 RX ADMIN — INSULIN GLARGINE SCH UNIT: 100 INJECTION, SOLUTION SUBCUTANEOUS at 20:41

## 2022-09-10 RX ADMIN — INSULIN GLARGINE SCH UNIT: 100 INJECTION, SOLUTION SUBCUTANEOUS at 08:24

## 2022-09-10 RX ADMIN — INSULIN LISPRO PRN UNIT: 100 INJECTION, SOLUTION INTRAVENOUS; SUBCUTANEOUS at 20:43

## 2022-09-10 RX ADMIN — CALCIUM CARBONATE SCH MG: 500 TABLET, CHEWABLE ORAL at 21:41

## 2022-09-10 RX ADMIN — INSULIN LISPRO PRN UNITS: 100 INJECTION, SOLUTION INTRAVENOUS; SUBCUTANEOUS at 12:04

## 2022-09-11 RX ADMIN — CALCIUM CARBONATE SCH MG: 500 TABLET, CHEWABLE ORAL at 21:10

## 2022-09-11 RX ADMIN — INSULIN LISPRO PRN UNITS: 100 INJECTION, SOLUTION INTRAVENOUS; SUBCUTANEOUS at 12:02

## 2022-09-11 RX ADMIN — INSULIN LISPRO PRN UNITS: 100 INJECTION, SOLUTION INTRAVENOUS; SUBCUTANEOUS at 17:22

## 2022-09-11 RX ADMIN — INSULIN GLARGINE SCH UNIT: 100 INJECTION, SOLUTION SUBCUTANEOUS at 08:35

## 2022-09-11 RX ADMIN — INSULIN LISPRO PRN UNITS: 100 INJECTION, SOLUTION INTRAVENOUS; SUBCUTANEOUS at 08:36

## 2022-09-11 RX ADMIN — INSULIN LISPRO PRN UNIT: 100 INJECTION, SOLUTION INTRAVENOUS; SUBCUTANEOUS at 21:11

## 2022-09-11 RX ADMIN — INSULIN GLARGINE SCH UNIT: 100 INJECTION, SOLUTION SUBCUTANEOUS at 21:11

## 2022-09-12 RX ADMIN — INSULIN LISPRO PRN UNITS: 100 INJECTION, SOLUTION INTRAVENOUS; SUBCUTANEOUS at 17:09

## 2022-09-12 RX ADMIN — INSULIN GLARGINE SCH UNIT: 100 INJECTION, SOLUTION SUBCUTANEOUS at 08:16

## 2022-09-12 RX ADMIN — INSULIN LISPRO PRN UNITS: 100 INJECTION, SOLUTION INTRAVENOUS; SUBCUTANEOUS at 08:17

## 2022-09-12 RX ADMIN — INSULIN LISPRO PRN UNITS: 100 INJECTION, SOLUTION INTRAVENOUS; SUBCUTANEOUS at 12:42

## 2022-09-12 RX ADMIN — INSULIN LISPRO PRN UNIT: 100 INJECTION, SOLUTION INTRAVENOUS; SUBCUTANEOUS at 21:08

## 2022-09-12 RX ADMIN — CALCIUM CARBONATE SCH MG: 500 TABLET, CHEWABLE ORAL at 21:06

## 2022-09-12 RX ADMIN — INSULIN GLARGINE SCH UNIT: 100 INJECTION, SOLUTION SUBCUTANEOUS at 21:07

## 2022-09-13 RX ADMIN — INSULIN LISPRO PRN UNIT: 100 INJECTION, SOLUTION INTRAVENOUS; SUBCUTANEOUS at 21:04

## 2022-09-13 RX ADMIN — INSULIN LISPRO PRN UNITS: 100 INJECTION, SOLUTION INTRAVENOUS; SUBCUTANEOUS at 17:05

## 2022-09-13 RX ADMIN — INSULIN GLARGINE SCH UNIT: 100 INJECTION, SOLUTION SUBCUTANEOUS at 08:15

## 2022-09-13 RX ADMIN — INSULIN LISPRO PRN UNITS: 100 INJECTION, SOLUTION INTRAVENOUS; SUBCUTANEOUS at 08:15

## 2022-09-13 RX ADMIN — INSULIN GLARGINE SCH UNIT: 100 INJECTION, SOLUTION SUBCUTANEOUS at 21:03

## 2022-09-13 RX ADMIN — INSULIN LISPRO PRN UNITS: 100 INJECTION, SOLUTION INTRAVENOUS; SUBCUTANEOUS at 11:55

## 2022-09-13 RX ADMIN — CALCIUM CARBONATE SCH MG: 500 TABLET, CHEWABLE ORAL at 21:02

## 2022-09-14 RX ADMIN — INSULIN LISPRO PRN UNIT: 100 INJECTION, SOLUTION INTRAVENOUS; SUBCUTANEOUS at 21:36

## 2022-09-14 RX ADMIN — INSULIN GLARGINE SCH UNIT: 100 INJECTION, SOLUTION SUBCUTANEOUS at 21:38

## 2022-09-14 RX ADMIN — INSULIN LISPRO PRN UNITS: 100 INJECTION, SOLUTION INTRAVENOUS; SUBCUTANEOUS at 07:54

## 2022-09-14 RX ADMIN — CALCIUM CARBONATE SCH MG: 500 TABLET, CHEWABLE ORAL at 21:36

## 2022-09-14 RX ADMIN — INSULIN GLARGINE SCH UNIT: 100 INJECTION, SOLUTION SUBCUTANEOUS at 07:59

## 2022-09-14 RX ADMIN — INSULIN LISPRO PRN UNITS: 100 INJECTION, SOLUTION INTRAVENOUS; SUBCUTANEOUS at 17:00

## 2022-09-14 RX ADMIN — INSULIN LISPRO PRN UNITS: 100 INJECTION, SOLUTION INTRAVENOUS; SUBCUTANEOUS at 12:16

## 2022-09-15 RX ADMIN — INSULIN LISPRO PRN UNITS: 100 INJECTION, SOLUTION INTRAVENOUS; SUBCUTANEOUS at 08:35

## 2022-09-15 RX ADMIN — INSULIN LISPRO PRN UNITS: 100 INJECTION, SOLUTION INTRAVENOUS; SUBCUTANEOUS at 17:10

## 2022-09-15 RX ADMIN — INSULIN LISPRO PRN UNITS: 100 INJECTION, SOLUTION INTRAVENOUS; SUBCUTANEOUS at 12:12

## 2022-09-15 RX ADMIN — INSULIN GLARGINE SCH UNIT: 100 INJECTION, SOLUTION SUBCUTANEOUS at 08:35

## 2022-09-15 RX ADMIN — INSULIN LISPRO PRN UNIT: 100 INJECTION, SOLUTION INTRAVENOUS; SUBCUTANEOUS at 21:42

## 2022-09-15 RX ADMIN — INSULIN GLARGINE SCH UNIT: 100 INJECTION, SOLUTION SUBCUTANEOUS at 21:40

## 2022-09-15 RX ADMIN — CALCIUM CARBONATE SCH MG: 500 TABLET, CHEWABLE ORAL at 21:28

## 2022-09-16 RX ADMIN — CALCIUM CARBONATE SCH MG: 500 TABLET, CHEWABLE ORAL at 20:38

## 2022-09-16 RX ADMIN — INSULIN LISPRO PRN UNITS: 100 INJECTION, SOLUTION INTRAVENOUS; SUBCUTANEOUS at 08:47

## 2022-09-16 RX ADMIN — INSULIN LISPRO PRN UNITS: 100 INJECTION, SOLUTION INTRAVENOUS; SUBCUTANEOUS at 20:51

## 2022-09-16 RX ADMIN — INSULIN GLARGINE SCH UNIT: 100 INJECTION, SOLUTION SUBCUTANEOUS at 08:48

## 2022-09-16 RX ADMIN — INSULIN LISPRO PRN UNITS: 100 INJECTION, SOLUTION INTRAVENOUS; SUBCUTANEOUS at 17:22

## 2022-09-17 RX ADMIN — INSULIN LISPRO PRN UNITS: 100 INJECTION, SOLUTION INTRAVENOUS; SUBCUTANEOUS at 18:14

## 2022-09-17 RX ADMIN — INSULIN LISPRO PRN UNITS: 100 INJECTION, SOLUTION INTRAVENOUS; SUBCUTANEOUS at 08:35

## 2022-09-17 RX ADMIN — INSULIN LISPRO PRN UNITS: 100 INJECTION, SOLUTION INTRAVENOUS; SUBCUTANEOUS at 12:00

## 2022-09-17 RX ADMIN — INSULIN GLARGINE SCH UNIT: 100 INJECTION, SOLUTION SUBCUTANEOUS at 08:34

## 2022-09-17 RX ADMIN — CALCIUM CARBONATE SCH MG: 500 TABLET, CHEWABLE ORAL at 21:26

## 2022-09-17 RX ADMIN — INSULIN LISPRO PRN UNIT: 100 INJECTION, SOLUTION INTRAVENOUS; SUBCUTANEOUS at 21:29

## 2022-09-18 RX ADMIN — INSULIN LISPRO PRN UNITS: 100 INJECTION, SOLUTION INTRAVENOUS; SUBCUTANEOUS at 12:28

## 2022-09-18 RX ADMIN — INSULIN LISPRO PRN UNIT: 100 INJECTION, SOLUTION INTRAVENOUS; SUBCUTANEOUS at 20:57

## 2022-09-18 RX ADMIN — INSULIN GLARGINE SCH UNIT: 100 INJECTION, SOLUTION SUBCUTANEOUS at 08:27

## 2022-09-18 RX ADMIN — CALCIUM CARBONATE SCH MG: 500 TABLET, CHEWABLE ORAL at 20:54

## 2022-09-18 RX ADMIN — INSULIN LISPRO PRN UNITS: 100 INJECTION, SOLUTION INTRAVENOUS; SUBCUTANEOUS at 08:28

## 2022-09-18 RX ADMIN — INSULIN LISPRO PRN UNITS: 100 INJECTION, SOLUTION INTRAVENOUS; SUBCUTANEOUS at 17:09

## 2022-09-19 RX ADMIN — INSULIN LISPRO PRN UNIT: 100 INJECTION, SOLUTION INTRAVENOUS; SUBCUTANEOUS at 21:01

## 2022-09-19 RX ADMIN — CALCIUM CARBONATE SCH MG: 500 TABLET, CHEWABLE ORAL at 20:36

## 2022-09-19 RX ADMIN — INSULIN LISPRO PRN UNITS: 100 INJECTION, SOLUTION INTRAVENOUS; SUBCUTANEOUS at 16:50

## 2022-09-19 RX ADMIN — INSULIN GLARGINE SCH UNIT: 100 INJECTION, SOLUTION SUBCUTANEOUS at 08:01

## 2022-09-19 RX ADMIN — INSULIN LISPRO PRN UNITS: 100 INJECTION, SOLUTION INTRAVENOUS; SUBCUTANEOUS at 12:11

## 2022-09-19 RX ADMIN — INSULIN LISPRO PRN UNITS: 100 INJECTION, SOLUTION INTRAVENOUS; SUBCUTANEOUS at 08:02

## 2022-09-20 VITALS — DIASTOLIC BLOOD PRESSURE: 81 MMHG | TEMPERATURE: 98.3 F | SYSTOLIC BLOOD PRESSURE: 123 MMHG

## 2022-09-20 RX ADMIN — INSULIN LISPRO PRN UNITS: 100 INJECTION, SOLUTION INTRAVENOUS; SUBCUTANEOUS at 08:05

## 2022-09-20 RX ADMIN — INSULIN LISPRO PRN UNITS: 100 INJECTION, SOLUTION INTRAVENOUS; SUBCUTANEOUS at 11:41

## 2022-09-20 RX ADMIN — INSULIN LISPRO PRN UNITS: 100 INJECTION, SOLUTION INTRAVENOUS; SUBCUTANEOUS at 17:06
